# Patient Record
Sex: FEMALE | Race: WHITE | NOT HISPANIC OR LATINO | Employment: PART TIME | URBAN - METROPOLITAN AREA
[De-identification: names, ages, dates, MRNs, and addresses within clinical notes are randomized per-mention and may not be internally consistent; named-entity substitution may affect disease eponyms.]

---

## 2021-12-14 ENCOUNTER — APPOINTMENT (OUTPATIENT)
Dept: ULTRASOUND IMAGING | Facility: CLINIC | Age: 26
End: 2021-12-14
Attending: EMERGENCY MEDICINE
Payer: COMMERCIAL

## 2021-12-14 ENCOUNTER — HOSPITAL ENCOUNTER (EMERGENCY)
Facility: CLINIC | Age: 26
Discharge: HOME | End: 2021-12-15
Attending: EMERGENCY MEDICINE | Admitting: EMERGENCY MEDICINE
Payer: COMMERCIAL

## 2021-12-14 DIAGNOSIS — O36.80X0 PREGNANCY, LOCATION UNKNOWN: Primary | ICD-10-CM

## 2021-12-14 DIAGNOSIS — Z32.01 PREGNANCY TEST POSITIVE: ICD-10-CM

## 2021-12-14 LAB
ALBUMIN SERPL-MCNC: 4 G/DL (ref 3.4–5)
ALBUMIN UR-MCNC: 300 MG/DL
ALP SERPL-CCNC: 46 U/L (ref 40–150)
ALT SERPL W P-5'-P-CCNC: 24 U/L (ref 0–50)
ANION GAP SERPL CALCULATED.3IONS-SCNC: 9 MMOL/L (ref 3–14)
APPEARANCE UR: CLEAR
AST SERPL W P-5'-P-CCNC: 19 U/L (ref 0–45)
B-HCG SERPL-ACNC: 529 IU/L (ref 0–5)
B-HCG SERPL-ACNC: 537 IU/L (ref 0–5)
BASOPHILS # BLD AUTO: 0 10E3/UL (ref 0–0.2)
BASOPHILS NFR BLD AUTO: 0 %
BILIRUB SERPL-MCNC: 0.4 MG/DL (ref 0.2–1.3)
BILIRUB UR QL STRIP: NEGATIVE
BUN SERPL-MCNC: 8 MG/DL (ref 7–30)
CALCIUM SERPL-MCNC: 8.8 MG/DL (ref 8.5–10.1)
CHLORIDE BLD-SCNC: 108 MMOL/L (ref 94–109)
CO2 SERPL-SCNC: 22 MMOL/L (ref 20–32)
COLOR UR AUTO: ABNORMAL
CREAT SERPL-MCNC: 0.64 MG/DL (ref 0.52–1.04)
EOSINOPHIL # BLD AUTO: 0.1 10E3/UL (ref 0–0.7)
EOSINOPHIL NFR BLD AUTO: 1 %
ERYTHROCYTE [DISTWIDTH] IN BLOOD BY AUTOMATED COUNT: 12.3 % (ref 10–15)
GFR SERPL CREATININE-BSD FRML MDRD: >90 ML/MIN/1.73M2
GLUCOSE BLD-MCNC: 100 MG/DL (ref 70–99)
GLUCOSE UR STRIP-MCNC: NEGATIVE MG/DL
HCG SERPL QL: POSITIVE
HCG UR QL: POSITIVE
HCT VFR BLD AUTO: 35.3 % (ref 35–47)
HGB BLD-MCNC: 12.3 G/DL (ref 11.7–15.7)
HGB UR QL STRIP: ABNORMAL
HOLD SPECIMEN: NORMAL
IMM GRANULOCYTES # BLD: 0 10E3/UL
IMM GRANULOCYTES NFR BLD: 0 %
KETONES UR STRIP-MCNC: >150 MG/DL
LEUKOCYTE ESTERASE UR QL STRIP: ABNORMAL
LIPASE SERPL-CCNC: 92 U/L (ref 73–393)
LYMPHOCYTES # BLD AUTO: 2.8 10E3/UL (ref 0.8–5.3)
LYMPHOCYTES NFR BLD AUTO: 28 %
MCH RBC QN AUTO: 31.2 PG (ref 26.5–33)
MCHC RBC AUTO-ENTMCNC: 34.8 G/DL (ref 31.5–36.5)
MCV RBC AUTO: 90 FL (ref 78–100)
MONOCYTES # BLD AUTO: 0.5 10E3/UL (ref 0–1.3)
MONOCYTES NFR BLD AUTO: 5 %
MUCOUS THREADS #/AREA URNS LPF: PRESENT /LPF
NEUTROPHILS # BLD AUTO: 6.7 10E3/UL (ref 1.6–8.3)
NEUTROPHILS NFR BLD AUTO: 66 %
NITRATE UR QL: NEGATIVE
NRBC # BLD AUTO: 0 10E3/UL
NRBC BLD AUTO-RTO: 0 /100
PH UR STRIP: 6 [PH] (ref 5–7)
PLATELET # BLD AUTO: 236 10E3/UL (ref 150–450)
POTASSIUM BLD-SCNC: 3.4 MMOL/L (ref 3.4–5.3)
PROT SERPL-MCNC: 7.2 G/DL (ref 6.8–8.8)
RADIOLOGIST FLAGS: ABNORMAL
RBC # BLD AUTO: 3.94 10E6/UL (ref 3.8–5.2)
RBC URINE: >182 /HPF
SODIUM SERPL-SCNC: 139 MMOL/L (ref 133–144)
SP GR UR STRIP: 1.03 (ref 1–1.03)
SQUAMOUS EPITHELIAL: 9 /HPF
UROBILINOGEN UR STRIP-MCNC: NORMAL MG/DL
WBC # BLD AUTO: 10.1 10E3/UL (ref 4–11)
WBC URINE: 164 /HPF

## 2021-12-14 PROCEDURE — 76801 OB US < 14 WKS SINGLE FETUS: CPT | Mod: 26 | Performed by: RADIOLOGY

## 2021-12-14 PROCEDURE — 80053 COMPREHEN METABOLIC PANEL: CPT | Performed by: EMERGENCY MEDICINE

## 2021-12-14 PROCEDURE — 258N000003 HC RX IP 258 OP 636: Performed by: EMERGENCY MEDICINE

## 2021-12-14 PROCEDURE — 250N000011 HC RX IP 250 OP 636: Performed by: EMERGENCY MEDICINE

## 2021-12-14 PROCEDURE — 81025 URINE PREGNANCY TEST: CPT | Performed by: EMERGENCY MEDICINE

## 2021-12-14 PROCEDURE — 36415 COLL VENOUS BLD VENIPUNCTURE: CPT | Performed by: EMERGENCY MEDICINE

## 2021-12-14 PROCEDURE — 96374 THER/PROPH/DIAG INJ IV PUSH: CPT | Performed by: EMERGENCY MEDICINE

## 2021-12-14 PROCEDURE — 76817 TRANSVAGINAL US OBSTETRIC: CPT | Mod: 26 | Performed by: RADIOLOGY

## 2021-12-14 PROCEDURE — 96361 HYDRATE IV INFUSION ADD-ON: CPT | Performed by: EMERGENCY MEDICINE

## 2021-12-14 PROCEDURE — 76801 OB US < 14 WKS SINGLE FETUS: CPT

## 2021-12-14 PROCEDURE — 99285 EMERGENCY DEPT VISIT HI MDM: CPT | Performed by: EMERGENCY MEDICINE

## 2021-12-14 PROCEDURE — 84703 CHORIONIC GONADOTROPIN ASSAY: CPT | Performed by: EMERGENCY MEDICINE

## 2021-12-14 PROCEDURE — 83690 ASSAY OF LIPASE: CPT | Performed by: EMERGENCY MEDICINE

## 2021-12-14 PROCEDURE — 81001 URINALYSIS AUTO W/SCOPE: CPT | Performed by: EMERGENCY MEDICINE

## 2021-12-14 PROCEDURE — 85025 COMPLETE CBC W/AUTO DIFF WBC: CPT | Performed by: EMERGENCY MEDICINE

## 2021-12-14 PROCEDURE — 84702 CHORIONIC GONADOTROPIN TEST: CPT | Performed by: STUDENT IN AN ORGANIZED HEALTH CARE EDUCATION/TRAINING PROGRAM

## 2021-12-14 PROCEDURE — 87086 URINE CULTURE/COLONY COUNT: CPT | Performed by: EMERGENCY MEDICINE

## 2021-12-14 PROCEDURE — 84702 CHORIONIC GONADOTROPIN TEST: CPT | Performed by: EMERGENCY MEDICINE

## 2021-12-14 PROCEDURE — 99285 EMERGENCY DEPT VISIT HI MDM: CPT | Mod: 25 | Performed by: EMERGENCY MEDICINE

## 2021-12-14 RX ORDER — MORPHINE SULFATE 4 MG/ML
4 INJECTION, SOLUTION INTRAMUSCULAR; INTRAVENOUS
Status: DISCONTINUED | OUTPATIENT
Start: 2021-12-14 | End: 2021-12-15 | Stop reason: HOSPADM

## 2021-12-14 RX ORDER — SODIUM CHLORIDE 9 MG/ML
INJECTION, SOLUTION INTRAVENOUS CONTINUOUS
Status: DISCONTINUED | OUTPATIENT
Start: 2021-12-14 | End: 2021-12-15 | Stop reason: HOSPADM

## 2021-12-14 RX ADMIN — SODIUM CHLORIDE: 9 INJECTION, SOLUTION INTRAVENOUS at 20:58

## 2021-12-14 RX ADMIN — SODIUM CHLORIDE 1000 ML: 9 INJECTION, SOLUTION INTRAVENOUS at 20:14

## 2021-12-14 RX ADMIN — MORPHINE SULFATE 4 MG: 4 INJECTION INTRAVENOUS at 21:23

## 2021-12-14 ASSESSMENT — ENCOUNTER SYMPTOMS
SHORTNESS OF BREATH: 0
ABDOMINAL PAIN: 1
FEVER: 0
NAUSEA: 0
VOMITING: 0

## 2021-12-15 VITALS
TEMPERATURE: 98.4 F | HEART RATE: 80 BPM | RESPIRATION RATE: 16 BRPM | DIASTOLIC BLOOD PRESSURE: 62 MMHG | SYSTOLIC BLOOD PRESSURE: 115 MMHG | OXYGEN SATURATION: 98 %

## 2021-12-15 DIAGNOSIS — O36.80X0 PREGNANCY, LOCATION UNKNOWN: Primary | ICD-10-CM

## 2021-12-15 DIAGNOSIS — O36.80X0 PREGNANCY OF UNKNOWN ANATOMIC LOCATION: Primary | ICD-10-CM

## 2021-12-15 LAB
BACTERIA UR CULT: NORMAL
BASOPHILS # BLD AUTO: 0 10E3/UL (ref 0–0.2)
BASOPHILS NFR BLD AUTO: 0 %
EOSINOPHIL # BLD AUTO: 0 10E3/UL (ref 0–0.7)
EOSINOPHIL NFR BLD AUTO: 0 %
ERYTHROCYTE [DISTWIDTH] IN BLOOD BY AUTOMATED COUNT: 12.5 % (ref 10–15)
HCT VFR BLD AUTO: 33.4 % (ref 35–47)
HGB BLD-MCNC: 11.8 G/DL (ref 11.7–15.7)
IMM GRANULOCYTES # BLD: 0 10E3/UL
IMM GRANULOCYTES NFR BLD: 0 %
LYMPHOCYTES # BLD AUTO: 1.5 10E3/UL (ref 0.8–5.3)
LYMPHOCYTES NFR BLD AUTO: 12 %
MCH RBC QN AUTO: 31.8 PG (ref 26.5–33)
MCHC RBC AUTO-ENTMCNC: 35.3 G/DL (ref 31.5–36.5)
MCV RBC AUTO: 90 FL (ref 78–100)
MONOCYTES # BLD AUTO: 0.3 10E3/UL (ref 0–1.3)
MONOCYTES NFR BLD AUTO: 3 %
NEUTROPHILS # BLD AUTO: 10.9 10E3/UL (ref 1.6–8.3)
NEUTROPHILS NFR BLD AUTO: 85 %
NRBC # BLD AUTO: 0 10E3/UL
NRBC BLD AUTO-RTO: 0 /100
PLATELET # BLD AUTO: 205 10E3/UL (ref 150–450)
RBC # BLD AUTO: 3.71 10E6/UL (ref 3.8–5.2)
WBC # BLD AUTO: 12.8 10E3/UL (ref 4–11)

## 2021-12-15 NOTE — DISCHARGE INSTRUCTIONS
You have a possible ectopic pregnancy.  Due to the early nature of this pregnancy this could also represent a normal pregnancy that we cannot yet visualize on ultrasound.   If you should develop worsening pain, bleeding or other concerns please return to the emergency department for repeat pregnancy test as well as a repeat ultrasound.  Otherwise please follow-up for a beta-hCG pregnancy test on Thursday as directed by OB/GYN.  They will then follow-up with you with further instructions

## 2021-12-15 NOTE — RESULT ENCOUNTER NOTE
[facility] Emergency Dept discharge antibiotic (if prescribed): None  No changes in treatment per [facility] ED Lab Result Urine culture protocol.

## 2021-12-15 NOTE — ED PROVIDER NOTES
"ED Provider Note  [facility]      History     Chief Complaint   Patient presents with     Abdominal Pain     The history is provided by the patient and medical records.     Jade Pink is a 26 year old female who presents to the emergency department with abdominal pain. Patient reports pain radiating from the left lower quadrant to the left flank starting at 5pm today. Currently the pain is localized in the left pelvic and inguinal regions of the abdomen. The pain began on Thursday as intermittent and dull pain. Today she states the pain is persistent and sharp. Patient denies nausea, vomiting, fever, chest pain, shortness of breath, or history of surgeries in the abdomen. Patient currently has a Nexplanon implant which is \"near the end\" and her last menstrual cycle was 3 weeks ago. Patient has normal menstrual cycles but notes some spotting yesterday. Patient believes the abdominal pain may be associated with her upcoming menstrual cycle. Patient notes she takes adderall as prescribed for ADHD.         Past Medical History  No past medical history on file.  No past surgical history on file.  No current outpatient medications on file.    No Known Allergies  Family History  No family history on file.  Social History   Social History     Tobacco Use     Smoking status: Not on file     Smokeless tobacco: Not on file   Substance Use Topics     Alcohol use: Not on file     Drug use: Not on file      Past medical history, past surgical history, medications, allergies, family history, and social history were reviewed with the patient. No additional pertinent items.       Review of Systems   Constitutional: Negative for fever.   Respiratory: Negative for shortness of breath.    Cardiovascular: Negative for chest pain.   Gastrointestinal: Positive for abdominal pain (LLQ, inguinal). Negative for nausea and vomiting.   Genitourinary: Positive for pelvic pain (left).     A complete review of " systems was performed with pertinent positives and negatives noted in the HPI, and all other systems negative.    Physical Exam   BP: 128/71  Pulse: 88  Temp: 98.4  F (36.9  C)  Resp: 20  SpO2: 99 %  Physical Exam  Constitutional:       General: She is not in acute distress.     Appearance: She is not diaphoretic.   HENT:      Head: Normocephalic.      Mouth/Throat:      Pharynx: No oropharyngeal exudate.   Eyes:      Extraocular Movements: Extraocular movements intact.   Cardiovascular:      Rate and Rhythm: Normal rate and regular rhythm.      Heart sounds: Normal heart sounds.   Pulmonary:      Effort: No respiratory distress.      Breath sounds: Normal breath sounds.   Abdominal:      General: There is no distension.      Palpations: Abdomen is soft.      Tenderness: There is abdominal tenderness.      Comments: Left lower quadrant tenderness   Musculoskeletal:         General: No deformity.      Cervical back: Neck supple.   Skin:     General: Skin is warm and dry.   Neurological:      Mental Status: She is alert.      Comments: alert   Psychiatric:         Behavior: Behavior normal.         ED Course      Procedures       Results for orders placed or performed during the hospital encounter of 12/14/21   US OB <14 Weeks W Transvaginal     Status: Abnormal   Result Value Ref Range    Radiologist flags (Urgent)      Possible unruptured left adnexal ectopic pregnancy. No    Narrative    EXAMINATION: TEMPORARY, 12/14/2021 9:59 PM     COMPARISON: None    HISTORY:  Abdominal pain, vaginal bleeding, positive pregnancy. LMP  11/23/2021. Quantitative beta hCG 529.      TECHNIQUE: The pelvis was scanned in standard fashion with  transabdominal and transvaginal transducer(s) using both grey scale  and limited color Doppler techniques.    FINDINGS:  The uterus measures 6.8 x 3.0 x 3.9 cm, and there is no evidence of a  focal fibroid.  The endometrium is within normal limits and measures 4  mm. There is fluid within the  endometrial canal.. There is small free  fluid in the pelvis.    The right ovary measures 5.4 x 4.1 x 7.0 cm and the left ovary  measures 4.9 x 2.5 x 3.9 cm. There is a heterogeneous septated cyst of  the right ovary measuring 4.0 x 4.0 x 4.7 cm without internal  vascularity. There is a second heterogeneous septated cyst of the  right ovary which measures 3.3 x 3.5 x 4.1 cm.  There is a heterogeneous solid mass of the left ovary which measures  1.1 x 1.5 x 1.2 cm without internal vascularity.  There is a heterogeneous soft tissue mass of the left adnexa which  measures 3.2 x 1.8 x 3.3 cm without internal vascularity. Central  hypoechoic structure surrounded by isoechoic structure is suspicious  for tubal ring sign.      Impression    IMPRESSION:   1. No gestational sac within the endometrial canal.  2. There is a heterogeneous soft tissue mass of the left adnexa with  internal hypoechoic structure which could represent an unruptured  ectopic pregnancy in the appropriate clinical presentation. Of note,  the corpus luteum also appears to be left-sided. Patient was diffusely  tender throughout the pelvis as per sonographer. Given low beta hCG  and early gestational age per the provided LMP, this is somewhat  nonspecific though. Recommend serial beta-hCG and short-term follow-up  ultrasound, if patient is stable.  3. Small amount of free fluid in the pelvis. No definite  hemoperitoneum identified sonographically.  4. Lesions in the right ovary most likely hemorrhagic cysts. Lesion in  the left ovary suspicious for corpus luteum.    [Urgent Result: Possible unruptured left adnexal ectopic pregnancy. No  intrauterine gestational sac.]    Finding was identified on 12/14/2021 9:59 PM.     Dr. Vuong was contacted by Dr. Means at 12/14/2021 10:09 PM and  verbalized understanding of the urgent finding.     Change in preliminary report was reported to Dr. Vuong by Dr. Means  at 12/14/2021 10:29 PM.    I have personally  reviewed the examination and initial interpretation  and I agree with the findings.    ZAINAB MARTIN MD         SYSTEM ID:  [id number]   HCG qualitative pregnancy (blood)     Status: Abnormal   Result Value Ref Range    hCG Serum Qualitative Positive (A) Negative   HCG qualitative urine (UPT)     Status: Abnormal   Result Value Ref Range    hCG Urine Qualitative Positive (A) Negative   Extra Blue Top Tube     Status: None   Result Value Ref Range    Hold Specimen JIC    Extra Red Top Tube     Status: None   Result Value Ref Range    Hold Specimen JIC    Extra Green Top (Lithium Heparin) Tube     Status: None   Result Value Ref Range    Hold Specimen JIC    Extra Purple Top Tube     Status: None   Result Value Ref Range    Hold Specimen JIC    UA with Microscopic reflex to Culture     Status: Abnormal    Specimen: Urine, Clean Catch   Result Value Ref Range    Color Urine Red (A) Colorless, Straw, Light Yellow, Yellow    Appearance Urine Clear Clear    Glucose Urine Negative Negative mg/dL    Bilirubin Urine Negative Negative    Ketones Urine >150 (A) Negative mg/dL    Specific Gravity Urine 1.032 1.003 - 1.035    Blood Urine Large (A) Negative    pH Urine 6.0 5.0 - 7.0    Protein Albumin Urine 300  (A) Negative mg/dL    Urobilinogen Urine Normal Normal, 2.0 mg/dL    Nitrite Urine Negative Negative    Leukocyte Esterase Urine Trace (A) Negative    Mucus Urine Present (A) None Seen /LPF    RBC Urine >182 (H) <=2 /HPF    WBC Urine 164 (H) <=5 /HPF    Squamous Epithelials Urine 9 (H) <=1 /HPF    Narrative    Urine Culture ordered based on laboratory criteria   Comprehensive metabolic panel     Status: Abnormal   Result Value Ref Range    Sodium 139 133 - 144 mmol/L    Potassium 3.4 3.4 - 5.3 mmol/L    Chloride 108 94 - 109 mmol/L    Carbon Dioxide (CO2) 22 20 - 32 mmol/L    Anion Gap 9 3 - 14 mmol/L    Urea Nitrogen 8 7 - 30 mg/dL    Creatinine 0.64 0.52 - 1.04 mg/dL    Calcium 8.8 8.5 - 10.1 mg/dL    Glucose 100 (H)  70 - 99 mg/dL    Alkaline Phosphatase 46 40 - 150 U/L    AST 19 0 - 45 U/L    ALT 24 0 - 50 U/L    Protein Total 7.2 6.8 - 8.8 g/dL    Albumin 4.0 3.4 - 5.0 g/dL    Bilirubin Total 0.4 0.2 - 1.3 mg/dL    GFR Estimate >90 >60 mL/min/1.73m2   Lipase     Status: Normal   Result Value Ref Range    Lipase 92 73 - 393 U/L   CBC with platelets and differential     Status: None   Result Value Ref Range    WBC Count 10.1 4.0 - 11.0 10e3/uL    RBC Count 3.94 3.80 - 5.20 10e6/uL    Hemoglobin 12.3 11.7 - 15.7 g/dL    Hematocrit 35.3 35.0 - 47.0 %    MCV 90 78 - 100 fL    MCH 31.2 26.5 - 33.0 pg    MCHC 34.8 31.5 - 36.5 g/dL    RDW 12.3 10.0 - 15.0 %    Platelet Count 236 150 - 450 10e3/uL    % Neutrophils 66 %    % Lymphocytes 28 %    % Monocytes 5 %    % Eosinophils 1 %    % Basophils 0 %    % Immature Granulocytes 0 %    NRBCs per 100 WBC 0 <1 /100    Absolute Neutrophils 6.7 1.6 - 8.3 10e3/uL    Absolute Lymphocytes 2.8 0.8 - 5.3 10e3/uL    Absolute Monocytes 0.5 0.0 - 1.3 10e3/uL    Absolute Eosinophils 0.1 0.0 - 0.7 10e3/uL    Absolute Basophils 0.0 0.0 - 0.2 10e3/uL    Absolute Immature Granulocytes 0.0 <=0.4 10e3/uL    Absolute NRBCs 0.0 10e3/uL   HCG quantitative pregnancy     Status: Abnormal   Result Value Ref Range    hCG Quantitative 529 (H) 0 - 5 IU/L   HCG Quantitative Pregnancy, Blood ([id number])     Status: Abnormal   Result Value Ref Range    hCG Quantitative 537 (H) 0 - 5 IU/L   CBC with platelets and differential     Status: Abnormal   Result Value Ref Range    WBC Count 12.8 (H) 4.0 - 11.0 10e3/uL    RBC Count 3.71 (L) 3.80 - 5.20 10e6/uL    Hemoglobin 11.8 11.7 - 15.7 g/dL    Hematocrit 33.4 (L) 35.0 - 47.0 %    MCV 90 78 - 100 fL    MCH 31.8 26.5 - 33.0 pg    MCHC 35.3 31.5 - 36.5 g/dL    RDW 12.5 10.0 - 15.0 %    Platelet Count 205 150 - 450 10e3/uL    % Neutrophils 85 %    % Lymphocytes 12 %    % Monocytes 3 %    % Eosinophils 0 %    % Basophils 0 %    % Immature Granulocytes 0 %    NRBCs per 100 WBC 0 <1  /100    Absolute Neutrophils 10.9 (H) 1.6 - 8.3 10e3/uL    Absolute Lymphocytes 1.5 0.8 - 5.3 10e3/uL    Absolute Monocytes 0.3 0.0 - 1.3 10e3/uL    Absolute Eosinophils 0.0 0.0 - 0.7 10e3/uL    Absolute Basophils 0.0 0.0 - 0.2 10e3/uL    Absolute Immature Granulocytes 0.0 <=0.4 10e3/uL    Absolute NRBCs 0.0 10e3/uL   [place] Draw     Status: None    Narrative    The following orders were created for panel order [place] Draw.  Procedure                               Abnormality         Status                     ---------                               -----------         ------                     Extra Blue Top Tube[[id number]]                              Final result               Extra Red Top Tube[[id number]]                               Final result               Extra Green Top (Lithium...[[id number]]                      Final result               Extra Purple Top Tube[[id number]]                            Final result                 Please view results for these tests on the individual orders.   CBC with platelets differential     Status: None    Narrative    The following orders were created for panel order CBC with platelets differential.  Procedure                               Abnormality         Status                     ---------                               -----------         ------                     CBC with platelets and d...[[id number]]                      Final result                 Please view results for these tests on the individual orders.   ABO/Rh type and screen *Canceled*     Status: None ()    Narrative    The following orders were created for panel order ABO/Rh type and screen.  Procedure                               Abnormality         Status                     ---------                               -----------         ------                       Please view results for these tests on the individual orders.   ABO/Rh type and screen *Canceled*     Status: None ()    Narrative     The following orders were created for panel order ABO/Rh type and screen.  Procedure                               Abnormality         Status                     ---------                               -----------         ------                     Adult Type and Screen[[id number]]                                                         Please view results for these tests on the individual orders.   CBC with platelets differential     Status: Abnormal    Narrative    The following orders were created for panel order CBC with platelets differential.  Procedure                               Abnormality         Status                     ---------                               -----------         ------                     CBC with platelets and d...[[id number]]  Abnormal            Final result                 Please view results for these tests on the individual orders.              Results for orders placed or performed during the hospital encounter of 12/14/21   US OB <14 Weeks W Transvaginal     Status: Abnormal   Result Value Ref Range    Radiologist flags (Urgent)      Possible unruptured left adnexal ectopic pregnancy. No    Narrative    EXAMINATION: TEMPORARY, 12/14/2021 9:59 PM     COMPARISON: None    HISTORY:  Abdominal pain, vaginal bleeding, positive pregnancy. LMP  11/23/2021. Quantitative beta hCG 529.      TECHNIQUE: The pelvis was scanned in standard fashion with  transabdominal and transvaginal transducer(s) using both grey scale  and limited color Doppler techniques.    FINDINGS:  The uterus measures 6.8 x 3.0 x 3.9 cm, and there is no evidence of a  focal fibroid.  The endometrium is within normal limits and measures 4  mm. There is fluid within the endometrial canal.. There is small free  fluid in the pelvis.    The right ovary measures 5.4 x 4.1 x 7.0 cm and the left ovary  measures 4.9 x 2.5 x 3.9 cm. There is a heterogeneous septated cyst of  the right ovary measuring 4.0 x 4.0 x 4.7 cm without  internal  vascularity. There is a second heterogeneous septated cyst of the  right ovary which measures 3.3 x 3.5 x 4.1 cm.  There is a heterogeneous solid mass of the left ovary which measures  1.1 x 1.5 x 1.2 cm without internal vascularity.  There is a heterogeneous soft tissue mass of the left adnexa which  measures 3.2 x 1.8 x 3.3 cm without internal vascularity. Central  hypoechoic structure surrounded by isoechoic structure is suspicious  for tubal ring sign.      Impression    IMPRESSION:   1. No gestational sac within the endometrial canal.  2. There is a heterogeneous soft tissue mass of the left adnexa with  internal hypoechoic structure which could represent an unruptured  ectopic pregnancy in the appropriate clinical presentation. Of note,  the corpus luteum also appears to be left-sided. Patient was diffusely  tender throughout the pelvis as per sonographer. Given low beta hCG  and early gestational age per the provided LMP, this is somewhat  nonspecific though. Recommend serial beta-hCG and short-term follow-up  ultrasound, if patient is stable.  3. Small amount of free fluid in the pelvis. No definite  hemoperitoneum identified sonographically.  4. Lesions in the right ovary most likely hemorrhagic cysts. Lesion in  the left ovary suspicious for corpus luteum.    [Urgent Result: Possible unruptured left adnexal ectopic pregnancy. No  intrauterine gestational sac.]    Finding was identified on 12/14/2021 9:59 PM.     Dr. Vuong was contacted by Dr. Means at 12/14/2021 10:09 PM and  verbalized understanding of the urgent finding.     Change in preliminary report was reported to Dr. Vuong by Dr. Means  at 12/14/2021 10:29 PM.    I have personally reviewed the examination and initial interpretation  and I agree with the findings.    ZAINAB MARTIN MD         SYSTEM ID:  [id number]   HCG qualitative pregnancy (blood)     Status: Abnormal   Result Value Ref Range    hCG Serum Qualitative Positive (A)  Negative   HCG qualitative urine (UPT)     Status: Abnormal   Result Value Ref Range    hCG Urine Qualitative Positive (A) Negative   Extra Blue Top Tube     Status: None   Result Value Ref Range    Hold Specimen JIC    Extra Red Top Tube     Status: None   Result Value Ref Range    Hold Specimen JIC    Extra Green Top (Lithium Heparin) Tube     Status: None   Result Value Ref Range    Hold Specimen JIC    Extra Purple Top Tube     Status: None   Result Value Ref Range    Hold Specimen JIC    UA with Microscopic reflex to Culture     Status: Abnormal    Specimen: Urine, Clean Catch   Result Value Ref Range    Color Urine Red (A) Colorless, Straw, Light Yellow, Yellow    Appearance Urine Clear Clear    Glucose Urine Negative Negative mg/dL    Bilirubin Urine Negative Negative    Ketones Urine >150 (A) Negative mg/dL    Specific Gravity Urine 1.032 1.003 - 1.035    Blood Urine Large (A) Negative    pH Urine 6.0 5.0 - 7.0    Protein Albumin Urine 300  (A) Negative mg/dL    Urobilinogen Urine Normal Normal, 2.0 mg/dL    Nitrite Urine Negative Negative    Leukocyte Esterase Urine Trace (A) Negative    Mucus Urine Present (A) None Seen /LPF    RBC Urine >182 (H) <=2 /HPF    WBC Urine 164 (H) <=5 /HPF    Squamous Epithelials Urine 9 (H) <=1 /HPF    Narrative    Urine Culture ordered based on laboratory criteria   Comprehensive metabolic panel     Status: Abnormal   Result Value Ref Range    Sodium 139 133 - 144 mmol/L    Potassium 3.4 3.4 - 5.3 mmol/L    Chloride 108 94 - 109 mmol/L    Carbon Dioxide (CO2) 22 20 - 32 mmol/L    Anion Gap 9 3 - 14 mmol/L    Urea Nitrogen 8 7 - 30 mg/dL    Creatinine 0.64 0.52 - 1.04 mg/dL    Calcium 8.8 8.5 - 10.1 mg/dL    Glucose 100 (H) 70 - 99 mg/dL    Alkaline Phosphatase 46 40 - 150 U/L    AST 19 0 - 45 U/L    ALT 24 0 - 50 U/L    Protein Total 7.2 6.8 - 8.8 g/dL    Albumin 4.0 3.4 - 5.0 g/dL    Bilirubin Total 0.4 0.2 - 1.3 mg/dL    GFR Estimate >90 >60 mL/min/1.73m2   Lipase      Status: Normal   Result Value Ref Range    Lipase 92 73 - 393 U/L   CBC with platelets and differential     Status: None   Result Value Ref Range    WBC Count 10.1 4.0 - 11.0 10e3/uL    RBC Count 3.94 3.80 - 5.20 10e6/uL    Hemoglobin 12.3 11.7 - 15.7 g/dL    Hematocrit 35.3 35.0 - 47.0 %    MCV 90 78 - 100 fL    MCH 31.2 26.5 - 33.0 pg    MCHC 34.8 31.5 - 36.5 g/dL    RDW 12.3 10.0 - 15.0 %    Platelet Count 236 150 - 450 10e3/uL    % Neutrophils 66 %    % Lymphocytes 28 %    % Monocytes 5 %    % Eosinophils 1 %    % Basophils 0 %    % Immature Granulocytes 0 %    NRBCs per 100 WBC 0 <1 /100    Absolute Neutrophils 6.7 1.6 - 8.3 10e3/uL    Absolute Lymphocytes 2.8 0.8 - 5.3 10e3/uL    Absolute Monocytes 0.5 0.0 - 1.3 10e3/uL    Absolute Eosinophils 0.1 0.0 - 0.7 10e3/uL    Absolute Basophils 0.0 0.0 - 0.2 10e3/uL    Absolute Immature Granulocytes 0.0 <=0.4 10e3/uL    Absolute NRBCs 0.0 10e3/uL   HCG quantitative pregnancy     Status: Abnormal   Result Value Ref Range    hCG Quantitative 529 (H) 0 - 5 IU/L   HCG Quantitative Pregnancy, Blood ([id number])     Status: Abnormal   Result Value Ref Range    hCG Quantitative 537 (H) 0 - 5 IU/L   CBC with platelets and differential     Status: Abnormal   Result Value Ref Range    WBC Count 12.8 (H) 4.0 - 11.0 10e3/uL    RBC Count 3.71 (L) 3.80 - 5.20 10e6/uL    Hemoglobin 11.8 11.7 - 15.7 g/dL    Hematocrit 33.4 (L) 35.0 - 47.0 %    MCV 90 78 - 100 fL    MCH 31.8 26.5 - 33.0 pg    MCHC 35.3 31.5 - 36.5 g/dL    RDW 12.5 10.0 - 15.0 %    Platelet Count 205 150 - 450 10e3/uL    % Neutrophils 85 %    % Lymphocytes 12 %    % Monocytes 3 %    % Eosinophils 0 %    % Basophils 0 %    % Immature Granulocytes 0 %    NRBCs per 100 WBC 0 <1 /100    Absolute Neutrophils 10.9 (H) 1.6 - 8.3 10e3/uL    Absolute Lymphocytes 1.5 0.8 - 5.3 10e3/uL    Absolute Monocytes 0.3 0.0 - 1.3 10e3/uL    Absolute Eosinophils 0.0 0.0 - 0.7 10e3/uL    Absolute Basophils 0.0 0.0 - 0.2 10e3/uL    Absolute  Immature Granulocytes 0.0 <=0.4 10e3/uL    Absolute NRBCs 0.0 10e3/uL   [place] Draw     Status: None    Narrative    The following orders were created for panel order [place] Draw.  Procedure                               Abnormality         Status                     ---------                               -----------         ------                     Extra Blue Top Tube[[id number]]                              Final result               Extra Red Top Tube[[id number]]                               Final result               Extra Green Top (Lithium...[[id number]]                      Final result               Extra Purple Top Tube[[id number]]                            Final result                 Please view results for these tests on the individual orders.   CBC with platelets differential     Status: None    Narrative    The following orders were created for panel order CBC with platelets differential.  Procedure                               Abnormality         Status                     ---------                               -----------         ------                     CBC with platelets and d...[[id number]]                      Final result                 Please view results for these tests on the individual orders.   ABO/Rh type and screen *Canceled*     Status: None ()    Narrative    The following orders were created for panel order ABO/Rh type and screen.  Procedure                               Abnormality         Status                     ---------                               -----------         ------                       Please view results for these tests on the individual orders.   ABO/Rh type and screen *Canceled*     Status: None ()    Narrative    The following orders were created for panel order ABO/Rh type and screen.  Procedure                               Abnormality         Status                     ---------                               -----------         ------                      Adult Type and Screen[[id number]]                                                         Please view results for these tests on the individual orders.   CBC with platelets differential     Status: Abnormal    Narrative    The following orders were created for panel order CBC with platelets differential.  Procedure                               Abnormality         Status                     ---------                               -----------         ------                     CBC with platelets and d...[[id number]]  Abnormal            Final result                 Please view results for these tests on the individual orders.     Medications   morphine (PF) injection 4 mg (4 mg Intravenous Given 12/14/21 2123)   0.9% sodium chloride BOLUS (0 mLs Intravenous Stopped 12/14/21 2058)     Followed by   sodium chloride 0.9% infusion ( Intravenous Stopped 12/15/21 0031)        Assessments & Plan (with Medical Decision Making)   26-year-old female presents to us with a chief complaint of abdominal pain.  Differential includes but not limited to ectopic pregnancy, ovarian cyst, implantation pain, dehydration, UTI.  Vital signs today were normal.  Initial routine pregnancy test was positive.  Quantitative came back in the 500s.  Ultrasound was ordered.  This showed no evidence of intrauterine pregnancy.  There was the possibility of a ectopic on the left adnexa.  Discussed with gynecology.  They evaluated the patient in person.  At this point the patient states that if this is a viable pregnancy she would like to carry it to completion.  Since we cannot definitively state if this is or is not an ectopic pregnancy we will have the patient follow-up for repeat hCG in 2 days after which she will be contacted and follow-up with OB/GYN.  Repeat hemoglobin was drawn.  Was slightly lower than the prior.  Vital signs are normal.  Patient is in no distress.  Patient was given return precautions regarding ruptured ectopic pregnancy  including worsening pain, bleeding, dizziness.  The patient at this point is comfortable with discharge home and the plan I have reviewed the nursing notes. I have reviewed the findings, diagnosis, plan and need for follow up with the patient.    There are no discharge medications for this patient.      Final diagnoses:   Pregnancy test positive     IIlsa, am serving as a trained medical scribe to document services personally performed by Matthew Vuong DO based on the provider's statements to me on December 14, 2021.  This document has been checked and approved by the attending provider.    I, Matthew Vuong DO, was physically present and have reviewed and verified the accuracy of this note documented by Ilsa Bardales medical scribe.          --  Matthew Vuong DO  [facility] EMERGENCY DEPARTMENT  12/14/2021     Matthew Vuong DO  12/15/21 0126

## 2021-12-15 NOTE — ED TRIAGE NOTES
Pt presents from triage experiencing abdominal pain on her L side. This pain radiates from her LLQ into her flank. Pt states this pain began around 1700 this evening.     No significant medical history noted. VSS.    Marisabel Torres RN on 12/14/2021 at 7:05 PM

## 2021-12-15 NOTE — CONSULTS
OB/GYN Consult  Jade Pink   1995  MRN [id number]    CC: LLQ pain    Consultation requested by Dr. Vuong, Emergency Department.     HPI: Jade Pink is a 26 year old G0 who presents with LLQ and found to have a positive pregnancy test. She reports sudden onset LLQ pain around 1700 today that quickly intensified leading her to seek emergency care. Here she was treated with IV morphine 4mg and the pain significantly improved. She reports just mild discomfort at this time. She denies any dizziness, lightheadedness, n/v. She also began to have spotting 5 days ago. This evening she noticed a slight increase to this, would maybe need to change a pad in 4 hours but not saturating.    This is her first pregnancy. She denies any history of sexually transmitted infection or PID. She does currently have a nexplanon in place since 2018. If viable, this would be an unplanned but desired pregnancy.    No headaches, vision changes, nausea, vomiting, fevers, chills, constipation, diarrhea, dysuria, changes in vaginal discharge or edema in extremities noted.     Gyn Hx:  Contraception: nexplanon placed 3/2018  LMP: 21  OB Hx: G0  STIs: none    PMH: ADHD    PSH: denies    No current facility-administered medications on file prior to encounter.  No current outpatient medications on file prior to encounter.       No Known Allergies     Social History     Socioeconomic History     Marital status:      Spouse name: Not on file     Number of children: Not on file     Years of education: Not on file     Highest education level: Not on file   Occupational History     Not on file   Tobacco Use     Smoking status: Not on file     Smokeless tobacco: Not on file   Substance and Sexual Activity     Alcohol use: Not on file     Drug use: Not on file     Sexual activity: Not on file   Other Topics Concern     Not on file   Social History Narrative     Not on file     Social Determinants of Health      Financial Resource Strain: Not on file   Food Insecurity: Not on file   Transportation Needs: Not on file   Physical Activity: Not on file   Stress: Not on file   Social Connections: Not on file   Intimate Partner Violence: Not on file   Housing Stability: Not on file        Objective:  Vitals:    12/14/21 2015 12/14/21 2225 12/14/21 2230 12/14/21 2300   BP: 122/80 111/68 111/68 111/65   BP Location:       Patient Position:       Pulse: 85 98  87   Resp: 16 16     Temp:       TempSrc:       SpO2: 100% 100% 100% 98%     Gen: appears well, NAD, cooperative  Heart: RRR with no murmurs noted  Lungs: CTAB, no wheezes, rubs or rhonchi noted. Good air movement throughout and no labored breathing  Abd: Soft, nondistended, nontender on exam 1.5 hours administration of IV morphine. No rebound or guarding.   : External exam with no bleeding, discharge, or odor.     Labs/Imaging:  Results for orders placed or performed during the hospital encounter of 12/14/21 (from the past 24 hour(s))   [place] Draw    Narrative    The following orders were created for panel order [place] Draw.  Procedure                               Abnormality         Status                     ---------                               -----------         ------                     Extra Blue Top Tube[[id number]]                              Final result               Extra Red Top Tube[[id number]]                               Final result               Extra Green Top (Lithium...[[id number]]                      Final result               Extra Purple Top Tube[[id number]]                            Final result                 Please view results for these tests on the individual orders.   HCG qualitative pregnancy (blood)   Result Value Ref Range    hCG Serum Qualitative Positive (A) Negative   Extra Blue Top Tube   Result Value Ref Range    Hold Specimen JIC    Extra Red Top Tube   Result Value Ref Range    Hold Specimen JIC    Extra Green Top (Lithium  Heparin) Tube   Result Value Ref Range    Hold Specimen JIC    Extra Purple Top Tube   Result Value Ref Range    Hold Specimen JIC    CBC with platelets differential    Narrative    The following orders were created for panel order CBC with platelets differential.  Procedure                               Abnormality         Status                     ---------                               -----------         ------                     CBC with platelets and d...[[id number]]                      Final result                 Please view results for these tests on the individual orders.   Comprehensive metabolic panel   Result Value Ref Range    Sodium 139 133 - 144 mmol/L    Potassium 3.4 3.4 - 5.3 mmol/L    Chloride 108 94 - 109 mmol/L    Carbon Dioxide (CO2) 22 20 - 32 mmol/L    Anion Gap 9 3 - 14 mmol/L    Urea Nitrogen 8 7 - 30 mg/dL    Creatinine 0.64 0.52 - 1.04 mg/dL    Calcium 8.8 8.5 - 10.1 mg/dL    Glucose 100 (H) 70 - 99 mg/dL    Alkaline Phosphatase 46 40 - 150 U/L    AST 19 0 - 45 U/L    ALT 24 0 - 50 U/L    Protein Total 7.2 6.8 - 8.8 g/dL    Albumin 4.0 3.4 - 5.0 g/dL    Bilirubin Total 0.4 0.2 - 1.3 mg/dL    GFR Estimate >90 >60 mL/min/1.73m2   Lipase   Result Value Ref Range    Lipase 92 73 - 393 U/L   CBC with platelets and differential   Result Value Ref Range    WBC Count 10.1 4.0 - 11.0 10e3/uL    RBC Count 3.94 3.80 - 5.20 10e6/uL    Hemoglobin 12.3 11.7 - 15.7 g/dL    Hematocrit 35.3 35.0 - 47.0 %    MCV 90 78 - 100 fL    MCH 31.2 26.5 - 33.0 pg    MCHC 34.8 31.5 - 36.5 g/dL    RDW 12.3 10.0 - 15.0 %    Platelet Count 236 150 - 450 10e3/uL    % Neutrophils 66 %    % Lymphocytes 28 %    % Monocytes 5 %    % Eosinophils 1 %    % Basophils 0 %    % Immature Granulocytes 0 %    NRBCs per 100 WBC 0 <1 /100    Absolute Neutrophils 6.7 1.6 - 8.3 10e3/uL    Absolute Lymphocytes 2.8 0.8 - 5.3 10e3/uL    Absolute Monocytes 0.5 0.0 - 1.3 10e3/uL    Absolute Eosinophils 0.1 0.0 - 0.7 10e3/uL    Absolute  Basophils 0.0 0.0 - 0.2 10e3/uL    Absolute Immature Granulocytes 0.0 <=0.4 10e3/uL    Absolute NRBCs 0.0 10e3/uL   HCG quantitative pregnancy   Result Value Ref Range    hCG Quantitative 529 (H) 0 - 5 IU/L   ABO/Rh type and screen *Canceled*    Narrative    The following orders were created for panel order ABO/Rh type and screen.  Procedure                               Abnormality         Status                     ---------                               -----------         ------                     Adult Type and Screen[[id number]]                                                         Please view results for these tests on the individual orders.   HCG Quantitative Pregnancy, Blood ([id number])   Result Value Ref Range    hCG Quantitative 537 (H) 0 - 5 IU/L   ABO/Rh type and screen *Canceled*    Narrative    The following orders were created for panel order ABO/Rh type and screen.  Procedure                               Abnormality         Status                     ---------                               -----------         ------                       Please view results for these tests on the individual orders.   HCG qualitative urine (UPT)   Result Value Ref Range    hCG Urine Qualitative Positive (A) Negative   UA with Microscopic reflex to Culture    Specimen: Urine, Clean Catch   Result Value Ref Range    Color Urine Red (A) Colorless, Straw, Light Yellow, Yellow    Appearance Urine Clear Clear    Glucose Urine Negative Negative mg/dL    Bilirubin Urine Negative Negative    Ketones Urine >150 (A) Negative mg/dL    Specific Gravity Urine 1.032 1.003 - 1.035    Blood Urine Large (A) Negative    pH Urine 6.0 5.0 - 7.0    Protein Albumin Urine 300  (A) Negative mg/dL    Urobilinogen Urine Normal Normal, 2.0 mg/dL    Nitrite Urine Negative Negative    Leukocyte Esterase Urine Trace (A) Negative    Mucus Urine Present (A) None Seen /LPF    RBC Urine >182 (H) <=2 /HPF    WBC Urine 164 (H) <=5 /HPF    Squamous  Epithelials Urine 9 (H) <=1 /HPF    Narrative    Urine Culture ordered based on laboratory criteria   US OB <14 Weeks W Transvaginal   Result Value Ref Range    Radiologist flags (Urgent)      Possible unruptured left adnexal ectopic pregnancy. No    Narrative    EXAMINATION: TEMPORARY, 12/14/2021 9:59 PM     COMPARISON: None    HISTORY:  Abdominal pain, vaginal bleeding, positive pregnancy. LMP  11/23/2021. Quantitative beta hCG 529.      TECHNIQUE: The pelvis was scanned in standard fashion with  transabdominal and transvaginal transducer(s) using both grey scale  and limited color Doppler techniques.    FINDINGS:  The uterus measures 6.8 x 3.0 x 3.9 cm, and there is no evidence of a  focal fibroid.  The endometrium is within normal limits and measures 4  mm. There is fluid within the endometrial canal.. There is small free  fluid in the pelvis.    The right ovary measures 5.4 x 4.1 x 7.0 cm and the left ovary  measures 4.9 x 2.5 x 3.9 cm. There is a heterogeneous septated cyst of  the right ovary measuring 4.0 x 4.0 x 4.7 cm without internal  vascularity. There is a second heterogeneous septated cyst of the  right ovary which measures 3.3 x 3.5 x 4.1 cm.  There is a heterogeneous solid mass of the left ovary which measures  1.1 x 1.5 x 1.2 cm without internal vascularity.  There is a heterogeneous soft tissue mass of the left adnexa which  measures 3.2 x 1.8 x 3.3 cm without internal vascularity. Central  hypoechoic structure surrounded by isoechoic structure is suspicious  for tubal ring sign.      Impression    IMPRESSION:   1. No gestational sac within the endometrial canal.  2. There is a heterogeneous soft tissue mass of the left adnexa with  internal hypoechoic structure which could represent an unruptured  ectopic pregnancy in the appropriate clinical presentation. Of note,  the corpus luteum also appears to be left-sided. Patient was diffusely  tender throughout the pelvis as per sonographer. Given low  beta hCG  and early gestational age per the provided LMP, this is somewhat  nonspecific though. Recommend serial beta-hCG and short-term follow-up  ultrasound, if patient is stable.  3. Small amount of free fluid in the pelvis. No definite  hemoperitoneum identified sonographically.  4. Lesions in the right ovary most likely hemorrhagic cysts. Lesion in  the left ovary suspicious for corpus luteum.    [Urgent Result: Possible unruptured left adnexal ectopic pregnancy. No  intrauterine gestational sac.]    Finding was identified on 2021 9:59 PM.     Dr. Vuong was contacted by Dr. Means at 2021 10:09 PM and  verbalized understanding of the urgent finding.     Change in preliminary report was reported to Dr. Vuong by Dr. Means  at 2021 10:29 PM.    I have personally reviewed the examination and initial interpretation  and I agree with the findings.    ZAINAB MARTIN MD         SYSTEM ID:  [id number]       Assessment/Plan: Jade Pink is a 26 year old  at 2w3d by LMP, here with pregnancy of unknown location. BHCG of 528 is below the discriminatory zone of when we might expect to identify pregnancy on US. US does show suspicious lesion in left adnexa that may or may not represent ectopic pregnancy. Discussed these findings with the patient. Given her hemodynamically stability and now completely nontender on exam, reassured there is low concern for ruptured ectopic pregnancy at this time. Discussed options including expectant, medical or surgical management and the risks and benefits of each option, including risk of rupture requiring emergency surgery if chooses against surgical management at this time. Patient desires expectant management given this would be a desired pregnancy if viable.    Patient lives 5 minutes from the hospital and lives with her  who will be with her in case of emergency. Reviewed return precautions including severe pain, dizziness/light headedness, n/v.  Reviewed recommendation to return to [facility] ED if these symptoms arise. Reviewed she can change her mind at anytime and present for surgical management with salpingectomy. Patient agrees to these terms.    - Will repeat a hemoglobin now for additional reassurance of no internal bleeding  - Repeat bHCG in 48 hours (12/16) with plan to follow up with patient pending results. If not rising appropriately and continues to show no sign of ruptured ectopic she would be a good candidate for methotrexate.     Patient seen and care plan discussed under supervision of  Dr. Flores.    Thank you for allowing us to be involved in the care of your patient. Please do not hesitate to give us a call with further questions. Team OB/GYN consult pagers [phone] from 6:30AM to 6:30PM or [phone] from 6PM to 6:30AM ([facility] [place]) or [phone] ([place]).    Patrizia Thakur MD  ObGyn Resident, PGY2  12/14/2021 11:43 PM     Appreciate Dr. Thakur's note above, patient's history and exam findings reviewed by me. I agree with the note above.   Junie Flores MD

## 2021-12-16 ENCOUNTER — OFFICE VISIT (OUTPATIENT)
Dept: OBGYN | Facility: CLINIC | Age: 26
End: 2021-12-16
Payer: COMMERCIAL

## 2021-12-16 ENCOUNTER — TELEPHONE (OUTPATIENT)
Dept: OBGYN | Facility: CLINIC | Age: 26
End: 2021-12-16

## 2021-12-16 VITALS
SYSTOLIC BLOOD PRESSURE: 125 MMHG | DIASTOLIC BLOOD PRESSURE: 78 MMHG | OXYGEN SATURATION: 100 % | HEART RATE: 114 BPM | WEIGHT: 149 LBS

## 2021-12-16 DIAGNOSIS — O36.80X0 PREGNANCY OF UNKNOWN ANATOMIC LOCATION: ICD-10-CM

## 2021-12-16 LAB
ABO/RH(D): NORMAL
ALBUMIN SERPL-MCNC: 4 G/DL (ref 3.4–5)
ALP SERPL-CCNC: 45 U/L (ref 40–150)
ALT SERPL W P-5'-P-CCNC: 30 U/L (ref 0–50)
ANION GAP SERPL CALCULATED.3IONS-SCNC: 3 MMOL/L (ref 3–14)
ANTIBODY SCREEN: NEGATIVE
AST SERPL W P-5'-P-CCNC: 22 U/L (ref 0–45)
B-HCG SERPL-ACNC: 203 IU/L (ref 0–5)
BILIRUB SERPL-MCNC: 0.4 MG/DL (ref 0.2–1.3)
BUN SERPL-MCNC: 9 MG/DL (ref 7–30)
CALCIUM SERPL-MCNC: 9.2 MG/DL (ref 8.5–10.1)
CHLORIDE BLD-SCNC: 109 MMOL/L (ref 94–109)
CO2 SERPL-SCNC: 27 MMOL/L (ref 20–32)
CREAT SERPL-MCNC: 0.73 MG/DL (ref 0.52–1.04)
ERYTHROCYTE [DISTWIDTH] IN BLOOD BY AUTOMATED COUNT: 12.5 % (ref 10–15)
GFR SERPL CREATININE-BSD FRML MDRD: >90 ML/MIN/1.73M2
GLUCOSE BLD-MCNC: 87 MG/DL (ref 70–99)
HCT VFR BLD AUTO: 36.9 % (ref 35–47)
HGB BLD-MCNC: 12.6 G/DL (ref 11.7–15.7)
MCH RBC QN AUTO: 30.7 PG (ref 26.5–33)
MCHC RBC AUTO-ENTMCNC: 34.1 G/DL (ref 31.5–36.5)
MCV RBC AUTO: 90 FL (ref 78–100)
PLATELET # BLD AUTO: 239 10E3/UL (ref 150–450)
POTASSIUM BLD-SCNC: 4.1 MMOL/L (ref 3.4–5.3)
PROT SERPL-MCNC: 7.3 G/DL (ref 6.8–8.8)
RBC # BLD AUTO: 4.11 10E6/UL (ref 3.8–5.2)
SODIUM SERPL-SCNC: 139 MMOL/L (ref 133–144)
SPECIMEN EXPIRATION DATE: NORMAL
WBC # BLD AUTO: 6.4 10E3/UL (ref 4–11)

## 2021-12-16 PROCEDURE — 86850 RBC ANTIBODY SCREEN: CPT | Performed by: OBSTETRICS & GYNECOLOGY

## 2021-12-16 PROCEDURE — 84702 CHORIONIC GONADOTROPIN TEST: CPT | Performed by: OBSTETRICS & GYNECOLOGY

## 2021-12-16 PROCEDURE — 85027 COMPLETE CBC AUTOMATED: CPT | Performed by: OBSTETRICS & GYNECOLOGY

## 2021-12-16 PROCEDURE — 80053 COMPREHEN METABOLIC PANEL: CPT | Performed by: OBSTETRICS & GYNECOLOGY

## 2021-12-16 PROCEDURE — 36415 COLL VENOUS BLD VENIPUNCTURE: CPT | Performed by: OBSTETRICS & GYNECOLOGY

## 2021-12-16 PROCEDURE — 86900 BLOOD TYPING SEROLOGIC ABO: CPT | Performed by: OBSTETRICS & GYNECOLOGY

## 2021-12-16 PROCEDURE — 99204 OFFICE O/P NEW MOD 45 MIN: CPT | Performed by: OBSTETRICS & GYNECOLOGY

## 2021-12-16 PROCEDURE — 86901 BLOOD TYPING SEROLOGIC RH(D): CPT | Performed by: OBSTETRICS & GYNECOLOGY

## 2021-12-16 ASSESSMENT — ANXIETY QUESTIONNAIRES
3. WORRYING TOO MUCH ABOUT DIFFERENT THINGS: SEVERAL DAYS
1. FEELING NERVOUS, ANXIOUS, OR ON EDGE: SEVERAL DAYS
2. NOT BEING ABLE TO STOP OR CONTROL WORRYING: NOT AT ALL
5. BEING SO RESTLESS THAT IT IS HARD TO SIT STILL: NOT AT ALL
7. FEELING AFRAID AS IF SOMETHING AWFUL MIGHT HAPPEN: SEVERAL DAYS
6. BECOMING EASILY ANNOYED OR IRRITABLE: SEVERAL DAYS
GAD7 TOTAL SCORE: 4
IF YOU CHECKED OFF ANY PROBLEMS ON THIS QUESTIONNAIRE, HOW DIFFICULT HAVE THESE PROBLEMS MADE IT FOR YOU TO DO YOUR WORK, TAKE CARE OF THINGS AT HOME, OR GET ALONG WITH OTHER PEOPLE: SOMEWHAT DIFFICULT

## 2021-12-16 ASSESSMENT — PATIENT HEALTH QUESTIONNAIRE - PHQ9
5. POOR APPETITE OR OVEREATING: NOT AT ALL
SUM OF ALL RESPONSES TO PHQ QUESTIONS 1-9: 6

## 2021-12-16 NOTE — PROGRESS NOTES
GYN Clinic Visit    Date of visit: 2021     Chief Complaint:   Chief Complaint   Patient presents with     ER F/U       HPI:   Jade Pink is a 26 year old G1who presents to clinic today in consultation pregnancy of unknown location.    Seen in the ED on 21 with LLQ, positive pregnancy test and Nexplanon in place.  Had non-specific left adnexal mass, nothing in the uterus.      Patient reports that leading up to Tuesday night, some crmaping that seemed digestion related.  Then Tu felt more pain on LLQ.  Worsened in the evening so came to ER.    Since Tuesday, feeling a lot better.  Got IV pain meds in ER.  Mild discomfort as it wore off, but yesterday only very minor discomfort with movement.  Took it easy and has been fine.    Has Nexplanon for nearly 4 years.  Was told it would be good for up to 5 years, so was very surprised to find out she was pregnant when in the ED.  She and partner do not feel ready for pregnancy at this point, so uncertain if they would desire to continue the pregnancy if it were determined to be normal IUP.    Medications:  No current outpatient medications on file prior to visit.  No current facility-administered medications on file prior to visit.      Allergy:  No Known Allergies  Patient denies food, latex or environmental allergies.     Obstetric History:   OB History    Para Term  AB Living   1 0 0 0 0 0   SAB IAB Ectopic Multiple Live Births   0 0 0 0 0      # Outcome Date GA Lbr Matt/2nd Weight Sex Delivery Anes PTL Lv   1 Current                Gynecologic History:  Patient's last menstrual period was 2021 (within weeks).  STI history: neg     No past medical history on file.    No past surgical history on file.    Social History:  Alcohol use  Social History    Substance and Sexual Activity      Alcohol use: Yes        Comment: occasionally    Tobacco use  History   Smoking Status     Never Smoker   Smokeless Tobacco     Not on file      Comment: Smoked E Cigarettes     Drug use  History   Drug Use Unknown     Sexual history  History   Sexual Activity     Sexual activity: Yes     Partners: Male     Birth control/ protection: Implant, Condom     Comment: nexplanon         Family History   Problem Relation Age of Onset     Depression Mother      Anxiety Disorder Mother      Depression Sister      Anxiety Disorder Sister      Cerebrovascular Disease Maternal Grandmother      Arthritis Maternal Grandmother      Prostate Cancer Maternal Grandfather      Cerebrovascular Disease Maternal Grandfather      Coronary Artery Disease Paternal Grandfather        Review of Systems:  Neg except per HPI      Physical Exam:  Vitals:    12/16/21 1411   BP: 125/78   Pulse: 114   SpO2: 100%   Weight: 67.6 kg (149 lb)       General:  Pleasant, in no acute distress.  CV:  Normal pulse.  No cyanosis.  Respiratory:  Breathing comfortably.  Ext:  No gross abnormalities.  Neurological:  Normal mental status.  Gait WNL.  Sensation and strength grossly intact  Psych - Appropriate mood and affect.        Assessment:  Jade Pink is a 26 year old G1 who presents with chief complaint   Chief Complaint   Patient presents with     ER F/U       Diagnosis:   Reviewed diagnosis of pregnancy of unknown location and possible outcomes of this.  Discussed typical diagnosis and treatment algorithm for PUL, including f/u hcg level, possible repeat labs/US, possible diagnostic D&C, methotrexate, etc  This was written down for her as flowsheet.    Have not verified insurance coverage for me/this clinic.  Strongly suggested to get labs today so we don't delay evaluation, as if this is ectopic, that can be dangerous.  Otherwise recommended the call insurance to see if I'm in Network; if not, asked that they reach out and I can make recommendations for docs within other systems.    F/u pending labs results.    Reviewed ectopic precautions and strongly recommended she return to ED if any  sudden onset, severe pain.    Margoth Mcmanus MD    48 minutes spent on the date of the encounter doing chart review, review of test results, interpretation of tests, patient visit, documentation and discussion with family

## 2021-12-16 NOTE — TELEPHONE ENCOUNTER
Left detailed message for pt to come in by 10am (if possible) for labs so they can be back in time for her appointment this afternoon.  Neetu Robertson RN

## 2021-12-16 NOTE — TELEPHONE ENCOUNTER
----- Message from Margoth Mcmanus MD sent at 12/15/2021  9:46 PM CST -----  Regarding: needs labs before appt  Pt seen in ED by [facility] this week for pregnancy of unknown location.  Has appointment with me Thurs afternoon, but ideally would have labs done in the morning so results available before appointment.  If no lab results, not much to discuss when she comes in.  Can you reach out to her and see if she can come in for lab work in the morning?    Thanks!  Margoth

## 2021-12-17 ENCOUNTER — MYC MEDICAL ADVICE (OUTPATIENT)
Dept: OBGYN | Facility: CLINIC | Age: 26
End: 2021-12-17
Payer: COMMERCIAL

## 2021-12-17 DIAGNOSIS — O36.80X0 PREGNANCY OF UNKNOWN ANATOMIC LOCATION: Primary | ICD-10-CM

## 2021-12-17 ASSESSMENT — ANXIETY QUESTIONNAIRES: GAD7 TOTAL SCORE: 4

## 2021-12-17 NOTE — TELEPHONE ENCOUNTER
TC to patient to discuss symptoms and lab results. Jade said she's feeling better today, understands lab results. Discussed plan to come in on Monday for a repeat lab draw and she said she will call back to let the clinic know if she is able to do this. Will follow up with her at the end of the day today (12/17).   Tana Isaacs RN

## 2021-12-20 ENCOUNTER — LAB (OUTPATIENT)
Dept: LAB | Facility: CLINIC | Age: 26
End: 2021-12-20
Payer: COMMERCIAL

## 2021-12-20 DIAGNOSIS — O36.80X0 PREGNANCY OF UNKNOWN ANATOMIC LOCATION: ICD-10-CM

## 2021-12-20 LAB — B-HCG SERPL-ACNC: 42 IU/L (ref 0–5)

## 2021-12-20 PROCEDURE — 84702 CHORIONIC GONADOTROPIN TEST: CPT

## 2021-12-20 PROCEDURE — 36415 COLL VENOUS BLD VENIPUNCTURE: CPT

## 2022-02-06 ENCOUNTER — HEALTH MAINTENANCE LETTER (OUTPATIENT)
Age: 27
End: 2022-02-06

## 2022-10-03 ENCOUNTER — HEALTH MAINTENANCE LETTER (OUTPATIENT)
Age: 27
End: 2022-10-03

## 2023-02-12 ENCOUNTER — HEALTH MAINTENANCE LETTER (OUTPATIENT)
Age: 28
End: 2023-02-12

## 2023-04-18 ENCOUNTER — LAB REQUISITION (OUTPATIENT)
Dept: LAB | Facility: CLINIC | Age: 28
End: 2023-04-18
Payer: COMMERCIAL

## 2023-04-18 DIAGNOSIS — Z34.90 ENCOUNTER FOR SUPERVISION OF NORMAL PREGNANCY, UNSPECIFIED, UNSPECIFIED TRIMESTER: ICD-10-CM

## 2023-04-18 LAB
ABO/RH(D): NORMAL
ANTIBODY SCREEN: NEGATIVE
HCG INTACT+B SERPL-ACNC: 5384 MIU/ML
SPECIMEN EXPIRATION DATE: NORMAL

## 2023-04-18 PROCEDURE — 84702 CHORIONIC GONADOTROPIN TEST: CPT | Mod: ORL | Performed by: OBSTETRICS & GYNECOLOGY

## 2023-04-18 PROCEDURE — 86901 BLOOD TYPING SEROLOGIC RH(D): CPT | Mod: ORL | Performed by: OBSTETRICS & GYNECOLOGY

## 2023-05-02 ENCOUNTER — LAB REQUISITION (OUTPATIENT)
Dept: LAB | Facility: CLINIC | Age: 28
End: 2023-05-02
Payer: COMMERCIAL

## 2023-05-02 DIAGNOSIS — Z31.9 ENCOUNTER FOR PROCREATIVE MANAGEMENT, UNSPECIFIED: ICD-10-CM

## 2023-05-02 DIAGNOSIS — Z30.9 ENCOUNTER FOR CONTRACEPTIVE MANAGEMENT, UNSPECIFIED: ICD-10-CM

## 2023-05-02 LAB — HCG INTACT+B SERPL-ACNC: 194 MIU/ML

## 2023-05-02 PROCEDURE — 84702 CHORIONIC GONADOTROPIN TEST: CPT | Mod: ORL | Performed by: OBSTETRICS & GYNECOLOGY

## 2023-05-09 ENCOUNTER — LAB REQUISITION (OUTPATIENT)
Dept: LAB | Facility: CLINIC | Age: 28
End: 2023-05-09
Payer: COMMERCIAL

## 2023-05-09 DIAGNOSIS — Z31.9 ENCOUNTER FOR PROCREATIVE MANAGEMENT, UNSPECIFIED: ICD-10-CM

## 2023-05-09 LAB — HCG INTACT+B SERPL-ACNC: 27 MIU/ML

## 2023-05-09 PROCEDURE — 84702 CHORIONIC GONADOTROPIN TEST: CPT | Mod: ORL | Performed by: OBSTETRICS & GYNECOLOGY

## 2023-05-16 ENCOUNTER — LAB REQUISITION (OUTPATIENT)
Dept: LAB | Facility: CLINIC | Age: 28
End: 2023-05-16

## 2023-05-16 DIAGNOSIS — Z31.9 ENCOUNTER FOR PROCREATIVE MANAGEMENT, UNSPECIFIED: ICD-10-CM

## 2023-05-16 LAB — HCG INTACT+B SERPL-ACNC: 5 MIU/ML

## 2023-05-16 PROCEDURE — 84702 CHORIONIC GONADOTROPIN TEST: CPT | Performed by: OBSTETRICS & GYNECOLOGY

## 2024-03-09 ENCOUNTER — HEALTH MAINTENANCE LETTER (OUTPATIENT)
Age: 29
End: 2024-03-09